# Patient Record
(demographics unavailable — no encounter records)

---

## 2017-03-03 NOTE — ROOR
________________________________________________________________________________

Patient Name: Gabrielle Kendrick          Procedure Date: 3/3/2017 2:59 PM

MRN: [id number]                          Account Number: [id number]

YOB: 1978               Age: 38

Room: [facility]                            Gender: Female

Note Status: Finalized                 

________________________________________________________________________________

 

Procedure:           Upper GI endoscopy

Indications:         Epigastric abdominal pain, Suspected gastroparesis, 

                     "narcotic gut suspected"

Providers:           Kip NIELSEN MD

Referring MD:        Kamran Jurado DO

Requesting Provider: 

Medicines:           Monitored Anesthesia Care

Complications:       No immediate complications.

________________________________________________________________________________

Procedure:           Pre-Anesthesia Assessment:

                     - The heart rate, respiratory rate, oxygen saturations, 

                     blood pressure, adequacy of pulmonary ventilation, and 

                     response to care were monitored throughout the procedure.

                     The Endoscope was introduced through the mouth, and 

                     advanced to the third part of duodenum. The upper GI 

                     endoscopy was accomplished without difficulty. The 

                     patient tolerated the procedure well.

                                                                                

Findings:

     The esophagus was normal.

     The stomach was normal.

     The examined duodenum was normal.

                                                                                

Impression:          - Normal esophagus.

                     - Normal stomach.

                     - Normal examined duodenum.

                     - No specimens collected.

Recommendation:      - Gastroparesis diet:

                     - Eat smaller, more frequent meals throughout the day.

                     - Low fat diet.

                     - Liquid/soft foods are tolerated better than solid foods.

                     - Low fiber/well cooked vegetables are tolerated better 

                     than high fiber/fibrous foods/raw vegetables.

                     - Avoid medications that inhibit gastric/intestinal 

                     motility such as narcotic medications.

                     - Continue present medications.

                                                                                

 

Kip Nielsen MD

________________

Kip NIELSEN MD

3/3/2017 3:16:32 PM

This report has been signed electronically.

Number of Addenda: 0

 

Note Initiated On: 3/3/2017 2:59 PM

Estimated Blood Loss:

     Estimated blood loss: none.

## 2021-02-05 NOTE — ROOR
________________________________________________________________________________

Patient Name: Gabrielle Kendrick          Procedure Date: 3/3/2017 3:01 PM

MRN: [id number]                          Account Number: [id number]

YOB: 1978               Age: 38

Room: [facility]                            Gender: Female

Note Status: Finalized                 

________________________________________________________________________________

 

Procedure:           Colonoscopy

Indications:         Generalized abdominal pain, Constipation, "Narcotic gut" 

                     suspected

Providers:           Kip NIELSEN MD

Referring MD:        Kamran Jurado DO

Requesting Provider: 

Medicines:           Monitored Anesthesia Care

Complications:       No immediate complications.

________________________________________________________________________________

Procedure:           Pre-Anesthesia Assessment:

                     - The heart rate, respiratory rate, oxygen saturations, 

                     blood pressure, adequacy of pulmonary ventilation, and 

                     response to care were monitored throughout the procedure.

                     The Colonoscope was introduced through the anus and 

                     advanced to 7 cm into the ileum. The colonoscopy was 

                     somewhat difficult due to inadequate bowel prep. 

                     Successful completion of the procedure was aided by 

                     lavage. The patient tolerated the procedure well. The 

                     quality of the bowel preparation was adequate and fair.

                                                                                

Findings:

     The perianal and digital rectal examinations were normal.

     (EXAM: Complete, PREP: Fair/Adequate) -- Preparation of the colon was 

     initially poor and required extensive lavage to bring visualisation to be 

     adequate.

     The terminal ileum appeared normal.

     Small Internal Hemorrhoids.

     The entire examined colon appeared normal on direct and retroflexion 

     views.

                                                                                

Impression:          - (EXAM: Complete, PREP: Fair, after lavage of retained 

                     stool)

                     - Small Internal Hemorrhoids.

                     - The examined portion of the ileum was normal.

                     - The entire colon is normal on direct and retroflexion 

                     views.

                     - No specimens collected.

Recommendation:      - Miralax 1 - 2 capfuls (17 - 34 grams) in 8 ounces of 

                     water twice a day.

                     - If miralax twice a day ineffective, consideration may 

                     be given to a trial on Movantik for narcotic induced 

                     constipation. This is typically prescribed by same 

                     provider prescribing suboxone.

                                                                                

 

Kip Nielsen MD

________________

Kip NIELSEN MD

3/3/2017 3:44:14 PM

This report has been signed electronically.

Number of Addenda: 0

 

Note Initiated On: 3/3/2017 3:01 PM

Estimated Blood Loss:

     Estimated blood loss: none. Helical Rim Advancement Flap Text: The defect edges were debeveled with a #15 blade scalpel.  Given the location of the defect and the proximity to free margins (helical rim) a double helical rim advancement flap was deemed most appropriate.  Using a sterile surgical marker, the appropriate advancement flaps were drawn incorporating the defect and placing the expected incisions between the helical rim and antihelix where possible.  The area thus outlined was incised through and through with a #15 scalpel blade.  With a skin hook and iris scissors, the flaps were gently and sharply undermined and freed up.